# Patient Record
Sex: FEMALE | Race: WHITE | NOT HISPANIC OR LATINO | Employment: FULL TIME | URBAN - METROPOLITAN AREA
[De-identification: names, ages, dates, MRNs, and addresses within clinical notes are randomized per-mention and may not be internally consistent; named-entity substitution may affect disease eponyms.]

---

## 2021-08-23 ENCOUNTER — TELEPHONE (OUTPATIENT)
Dept: SCHEDULING | Facility: IMAGING CENTER | Age: 41
End: 2021-08-23

## 2021-09-03 ENCOUNTER — OFFICE VISIT (OUTPATIENT)
Dept: MEDICAL GROUP | Facility: PHYSICIAN GROUP | Age: 41
End: 2021-09-03
Payer: COMMERCIAL

## 2021-09-03 VITALS
SYSTOLIC BLOOD PRESSURE: 122 MMHG | TEMPERATURE: 98.1 F | OXYGEN SATURATION: 96 % | DIASTOLIC BLOOD PRESSURE: 68 MMHG | HEIGHT: 65 IN | RESPIRATION RATE: 12 BRPM | BODY MASS INDEX: 41.32 KG/M2 | WEIGHT: 248 LBS | HEART RATE: 91 BPM

## 2021-09-03 DIAGNOSIS — Z98.890 HISTORY OF GASTRIC SURGERY: ICD-10-CM

## 2021-09-03 DIAGNOSIS — F33.0 MILD EPISODE OF RECURRENT MAJOR DEPRESSIVE DISORDER (HCC): ICD-10-CM

## 2021-09-03 DIAGNOSIS — H40.053 INCREASED PRESSURE IN THE EYE, BILATERAL: ICD-10-CM

## 2021-09-03 PROBLEM — H40.89 OTHER SPECIFIED GLAUCOMA: Status: ACTIVE | Noted: 2021-09-03

## 2021-09-03 PROCEDURE — 99204 OFFICE O/P NEW MOD 45 MIN: CPT | Performed by: NURSE PRACTITIONER

## 2021-09-03 RX ORDER — CITALOPRAM HYDROBROMIDE 10 MG/1
10 TABLET ORAL DAILY
Qty: 90 TABLET | Refills: 3 | Status: SHIPPED | OUTPATIENT
Start: 2021-09-03

## 2021-09-03 RX ORDER — CITALOPRAM HYDROBROMIDE 10 MG/1
10 TABLET ORAL DAILY
COMMUNITY
End: 2021-09-03 | Stop reason: SDUPTHER

## 2021-09-03 RX ORDER — BIMATOPROST 0.1 MG/ML
1 SOLUTION/ DROPS OPHTHALMIC
COMMUNITY

## 2021-09-03 ASSESSMENT — PATIENT HEALTH QUESTIONNAIRE - PHQ9
5. POOR APPETITE OR OVEREATING: NOT AT ALL
6. FEELING BAD ABOUT YOURSELF - OR THAT YOU ARE A FAILURE OR HAVE LET YOURSELF OR YOUR FAMILY DOWN: NOT AL ALL
7. TROUBLE CONCENTRATING ON THINGS, SUCH AS READING THE NEWSPAPER OR WATCHING TELEVISION: NOT AT ALL
2. FEELING DOWN, DEPRESSED, IRRITABLE, OR HOPELESS: NOT AT ALL
SUM OF ALL RESPONSES TO PHQ9 QUESTIONS 1 AND 2: 0
8. MOVING OR SPEAKING SO SLOWLY THAT OTHER PEOPLE COULD HAVE NOTICED. OR THE OPPOSITE, BEING SO FIGETY OR RESTLESS THAT YOU HAVE BEEN MOVING AROUND A LOT MORE THAN USUAL: NOT AT ALL
3. TROUBLE FALLING OR STAYING ASLEEP OR SLEEPING TOO MUCH: NOT AT ALL
4. FEELING TIRED OR HAVING LITTLE ENERGY: NOT AT ALL
9. THOUGHTS THAT YOU WOULD BE BETTER OFF DEAD, OR OF HURTING YOURSELF: NOT AT ALL
SUM OF ALL RESPONSES TO PHQ QUESTIONS 1-9: 0
CLINICAL INTERPRETATION OF PHQ2 SCORE: 0
1. LITTLE INTEREST OR PLEASURE IN DOING THINGS: NOT AT ALL

## 2021-09-03 NOTE — ASSESSMENT & PLAN NOTE
This is a chronic condition, stable.  She reports she has been on celexa for 1 year; and reports working well for depression.  She does have a history of postpartum depression.  She is requesting for refill on her celexa today.  She does not currently follow with therapy - declines to establish with new one today. She denies SI/HI; has good support through family and friends.   Depression Screening  Little interest or pleasure in doing things?  0 - not at all  Feeling down, depressed , or hopeless? 0 - not at all  Patient Health Questionnaire Score: 0   > Continue Celexa at current dose.

## 2021-09-03 NOTE — PROGRESS NOTES
Subjective  Chief Complaint  Establish care to manage her chronic conditions    History of Present Illness  Meg Chao is a 41 y.o. female. This patient is here today to establish care.  Her prior PCP was Hort[facility] Moreira [facility].  Patient Active Problem List    Diagnosis Date Noted   • Increased pressure in the eye, bilateral 09/03/2021   • History of gastric surgery 09/03/2021   • Mild episode of recurrent major depressive disorder (HCC) 09/03/2021     Past Medical History    Allergies: Sulfa drugs  History reviewed. No pertinent past medical history.  Past Surgical History:   Procedure Laterality Date   • OTHER ABDOMINAL SURGERY  08/2020    gastric sleeve   • CHOLECYSTECTOMY  2020     Current Outpatient Medications Ordered in Epic   Medication Sig Dispense Refill   • bimatoprost (LUMIGAN) 0.01 % Solution Administer 1 Drop into both eyes at bedtime.     • citalopram (CELEXA) 10 MG tablet Take 1 Tablet by mouth every day. 90 Tablet 3     No current Epic-ordered facility-administered medications on file.     Family History:    Family History   Problem Relation Age of Onset   • Cancer Mother         uterine   • Cancer Maternal Grandfather         colon   • Diabetes Paternal Grandmother    • Heart Disease Neg Hx    • Hypertension Neg Hx    • Hyperlipidemia Neg Hx    • Stroke Neg Hx       Personal/Social History:    Social History     Tobacco Use   • Smoking status: Never Smoker   • Smokeless tobacco: Never Used   Vaping Use   • Vaping Use: Never used   Substance Use Topics   • Alcohol use: Yes     Alcohol/week: 4.2 oz     Types: 7 Glasses of wine per week     Comment: one drink a night   • Drug use: Never     Social History     Social History Narrative   • Not on file      Current Outpatient Medications   Medication Sig Dispense Refill   • bimatoprost (LUMIGAN) 0.01 % Solution Administer 1 Drop into both eyes at bedtime.     • citalopram (CELEXA) 10 MG tablet Take 1 Tablet by mouth every day. 90  "Tablet 3     No current facility-administered medications for this visit.     Review of Systems  General: Negative for fever/chills.   Eyes:  Negative for vision changes.  ENT:  Negative for hearing changes.   Respiratory:  Negative for cough and dyspnea.    Cardiovascular:  Negative for chest pain and palpitations.  Gastrointestinal:  Negative for nausea/vomiting.   Genitourinary:  Negative for dysuria.   Musculoskeletal:  Negative for myalgias.   Skin:  Negative for rash.   Neurological:  Negative for numbness/tingling.   Heme/Lymph:  Does not bruise/bleed easily.    Objective  Physical Exam  /68 (BP Location: Right arm, Patient Position: Sitting, BP Cuff Size: Large adult)   Pulse 91   Temp 36.7 °C (98.1 °F) (Temporal)   Resp 12   Ht 1.651 m (5' 5\")   Wt 112 kg (248 lb)   SpO2 96%  Body mass index is 41.27 kg/m².  General:  Alert and oriented.  Well appearing.  NAD.  Head:  Normocephalic.   Eyes:  Eyes conjunctiva clear lids without ptosis.    ENT: Ears normal shape and contour.   Neck: Supple without JVD.   Pulmonary:  Normal effort.  Clear to ausculation without rales, ronchi, or wheezing.  Cardiovascular:  Regular rate and rhythm without murmur, rubs or gallop.  Radial pulses are intact and equal bilaterally.  Gastrointestinal: Abdomen soft, nontender, nondistended. Normal bowel sounds. Liver and spleen are not palpable.  Musculoskeletal:  No extremity cyanosis, clubbing, or edema.  Skin:  Warm and dry.  No obvious lesions.  Neurologic: Grossly intact.  Sensation intact.   Psych: Normal mood and affect. Alert and oriented x3. Judgment and insight is normal.    Assessment/Plan   41 y.o. female presenting with the following.     1. Mild episode of recurrent major depressive disorder (HCC)  citalopram (CELEXA) 10 MG tablet   2. Increased pressure in the eye, bilateral     3. History of gastric surgery       History of gastric surgery  This is chronic condition, stable.  She had gastric sleeve done " 8/2020 with Dr. Pitts.  She continues to follow yearly and has done labs this year.  She reports that she has been able to keep the weight off, and she has no concerns regarding this at this time.   > She will send me labs that were obtained by Dr. Pitts.  Advised to continue diet and lifestyle measures.  Advised to continue follow up with general surgery.     Increased pressure in the eye, bilateral  This is a chronic condition, stable.  She follows closely with ophthalmology.  She reports she sees them once a year and uses lumigan as preventative for glaucoma.  She denies any changes to her vision or eye pressure.   > Continue medication treatment plan per and close follow up with ophthalmology.     Mild episode of recurrent major depressive disorder (HCC)  This is a chronic condition, stable.  She reports she has been on celexa for 1 year; and reports working well for depression.  She does have a history of postpartum depression.  She is requesting for refill on her celexa today.  She does not currently follow with therapy - declines to establish with new one today. She denies SI/HI; has good support through family and friends.   Depression Screening  Little interest or pleasure in doing things?  0 - not at all  Feeling down, depressed , or hopeless? 0 - not at all  Patient Health Questionnaire Score: 0   > Continue Celexa at current dose.       Return in about 1 year (around 9/3/2022), or if symptoms worsen or fail to improve.    Health Maintenance: Completed    I have placed the below orders and discussed them with an approved delegating provider.  The MA is performing the below orders under the direction of Dr. Francis.     Please note that this dictation was created using voice recognition software. I have worked with consultants from the vendor as well as technical experts from [employer] to optimize the interface. I have made every reasonable attempt to correct obvious errors, but I expect that there  are errors of grammar and possibly content that I did not discover before finalizing the note.    LAUREL Evangelista  [facility]

## 2021-09-03 NOTE — ASSESSMENT & PLAN NOTE
This is chronic condition, stable.  She had gastric sleeve done 8/2020 with Dr. Pitts.  She continues to follow yearly and has done labs this year.  She reports that she has been able to keep the weight off, and she has no concerns regarding this at this time.   > She will send me labs that were obtained by Dr. Pitts.  Advised to continue diet and lifestyle measures.  Advised to continue follow up with general surgery.

## 2021-09-03 NOTE — LETTER
[address]  FARNAZ Evangelista  [address]  [place] [address]  Fax: [phone]   Authorization for Release/Disclosure of   Protected Health Information   Name: MEG CHAO : 1980 SSN: [id number]   Address: [address] [place] [address]  [place] [address] Phone:    [phone] (home)    I authorize the entity listed below to release/disclose the PHI below to:   [address]FARNAZ Evangelista and FARNAZ Evangelista   Provider or Entity Name:  [facility] [place]    Address   City, [facility], [facility]   Phone:      Fax:[phone]     Reason for request: continuity of care   Information to be released:    [  ] LAST COLONOSCOPY,  including any PATH REPORT and follow-up  [  ] LAST FIT/COLOGUARD RESULT [  ] LAST DEXA  [  ] LAST MAMMOGRAM  [  ] LAST PAP  [  ] LAST LABS [  ] RETINA EXAM REPORT  [  ] IMMUNIZATION RECORDS  [XX] Release all info      [  ] Check here and initial the line next to each item to release ALL health information INCLUDING  _____ Care and treatment for drug and / or alcohol abuse  _____ HIV testing, infection status, or AIDS  _____ Genetic Testing    DATES OF SERVICE OR TIME PERIOD TO BE DISCLOSED: _____________  I understand and acknowledge that:  * This Authorization may be revoked at any time by you in writing, except if your health information has already been used or disclosed.  * Your health information that will be used or disclosed as a result of you signing this authorization could be re-disclosed by the recipient. If this occurs, your re-disclosed health information may no longer be protected by State or Federal laws.  * You may refuse to sign this Authorization. Your refusal will not affect your ability to obtain treatment.  * This Authorization becomes effective upon signing and will  on (date) __________.      If no date is indicated, this Authorization will  one (1) year from the signature date.    Name: Meg Chao    Signature: continuity of  care   Date:     9/3/2021       PLEASE FAX REQUESTED RECORDS BACK TO: [phone]

## 2021-09-03 NOTE — ASSESSMENT & PLAN NOTE
This is a chronic condition, stable.  She follows closely with ophthalmology.  She reports she sees them once a year and uses lumigan as preventative for glaucoma.  She denies any changes to her vision or eye pressure.   > Continue medication treatment plan per and close follow up with ophthalmology.

## 2024-04-11 ENCOUNTER — APPOINTMENT (RX ONLY)
Dept: URBAN - METROPOLITAN AREA CLINIC 308 | Facility: CLINIC | Age: 44
Setting detail: DERMATOLOGY
End: 2024-04-11

## 2024-04-11 DIAGNOSIS — L50.1 IDIOPATHIC URTICARIA: ICD-10-CM

## 2024-04-11 PROBLEM — D48.5 NEOPLASM OF UNCERTAIN BEHAVIOR OF SKIN: Status: ACTIVE | Noted: 2024-04-11

## 2024-04-11 PROCEDURE — ? BIOPSY BY SHAVE METHOD

## 2024-04-11 PROCEDURE — ? COUNSELING

## 2024-04-11 PROCEDURE — 11102 TANGNTL BX SKIN SINGLE LES: CPT

## 2024-04-11 PROCEDURE — 99203 OFFICE O/P NEW LOW 30 MIN: CPT | Mod: 25

## 2024-04-11 NOTE — PROCEDURE: BIOPSY BY SHAVE METHOD
Detail Level: Detailed
Depth Of Biopsy: dermis
Was A Bandage Applied: Yes
Size Of Lesion In Cm: 0.9
X Size Of Lesion In Cm: 0
Biopsy Type: H and E
Biopsy Method: Personna blade
Anesthesia Type: 1% lidocaine with epinephrine
Anesthesia Volume In Cc: 0.5
Hemostasis: Drysol
Wound Care: Petrolatum
Dressing: bandage
Destruction After The Procedure: No
Type Of Destruction Used: Curettage
Curettage Text: The wound bed was treated with curettage after the biopsy was performed.
Cryotherapy Text: The wound bed was treated with cryotherapy after the biopsy was performed.
Electrodesiccation Text: The wound bed was treated with electrodesiccation after the biopsy was performed.
Electrodesiccation And Curettage Text: The wound bed was treated with electrodesiccation and curettage after the biopsy was performed.
Silver Nitrate Text: The wound bed was treated with silver nitrate after the biopsy was performed.
Lab: 6
Lab Facility: [id number]
Consent: Verbal consent was obtained and risks were reviewed including but not limited to scarring, infection, bleeding, scabbing, incomplete removal, nerve damage and allergy to anesthesia.
Post-Care Instructions: I reviewed with the patient in detail post-care instructions. Patient is to keep the biopsy site dry overnight, and then apply bacitracin twice daily until healed. Patient may apply hydrogen peroxide soaks to remove any crusting.
Notification Instructions: Patient will be notified of biopsy results. However, patient instructed to call the office if not contacted within 2 weeks.
Billing Type: Third-Party Bill
Information: Selecting Yes will display possible errors in your note based on the variables you have selected. This validation is only offered as a suggestion for you. PLEASE NOTE THAT THE VALIDATION TEXT WILL BE REMOVED WHEN YOU FINALIZE YOUR NOTE. IF YOU WANT TO FAX A PRELIMINARY NOTE YOU WILL NEED TO TOGGLE THIS TO 'NO' IF YOU DO NOT WANT IT IN YOUR FAXED NOTE.

## 2024-04-11 NOTE — PROCEDURE: COUNSELING
Patient Specific Counseling (Will Not Stick From Patient To Patient): Pt reports that she has been experiencing hives about once a month and does not report known trigger. Pt wonders about allergic contact dermatitis, food allergy, and stress. Pt advised to take anti-histamine for flares and advised to RTC if condition worsens. ACD unlikely. She can pursue food allergy testing with an allergist if she desires.
Detail Level: Detailed

## 2024-04-11 NOTE — HPI: EVALUATION OF SKIN LESION(S)
What Type Of Note Output Would You Prefer (Optional)?: Standard Output
Hpi Title: Evaluation of Skin Lesions
How Severe Are Your Spot(S)?: moderate
Have Your Spot(S) Been Treated In The Past?: has not been treated
Additional History: Pt presents for evaluation of a lesion on her chest. She would like to discuss what she describes as hives, although they are not present today. Pt would also like to discuss skin tightening of her neck.

## 2024-05-22 ENCOUNTER — APPOINTMENT (RX ONLY)
Dept: URBAN - METROPOLITAN AREA CLINIC 308 | Facility: CLINIC | Age: 44
Setting detail: DERMATOLOGY
End: 2024-05-22

## 2024-05-22 DIAGNOSIS — Z41.1 ENCOUNTER FOR COSMETIC SURGERY: ICD-10-CM

## 2024-05-22 PROCEDURE — ? CONSULTATION - ABDOMINOPLASTY

## 2024-05-22 PROCEDURE — ? COSMETIC CONSULTATION: INMODE MORPHEUS 8

## 2024-05-22 PROCEDURE — ? CONSULTATION - BREAST (COSMETIC)

## 2024-05-22 PROCEDURE — ? CONSULTATION - BRACHIOPLASTY

## 2024-05-22 NOTE — PROCEDURE: CONSULTATION - BRACHIOPLASTY
Detail Level: Detailed
Send Procedure Quote As Charge: No
Additional Comments: Suggested doing breast procedure in tandem with brachioplasty, to enable better scarring. This will also prevent undesirable volume that may occur by  procedures.

## 2024-05-22 NOTE — HPI: COSMETIC CONSULTATION
[id number]: [id number]
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 She is also interested in other procedures to remove excess skin after weight loss, and would like to discuss possible options for pereira breasts.

## 2024-05-22 NOTE — PROCEDURE: CONSULTATION - BREAST (COSMETIC)
Count Minor/Major Decisions Toward Mdm (Not Cosmetic)?: No
Consultation Charge $ (Use Numbers Only, No Text Please.): 0
Detail Level: Detailed